# Patient Record
Sex: FEMALE | Race: WHITE | Employment: OTHER | URBAN - METROPOLITAN AREA
[De-identification: names, ages, dates, MRNs, and addresses within clinical notes are randomized per-mention and may not be internally consistent; named-entity substitution may affect disease eponyms.]

---

## 2020-05-30 ENCOUNTER — HOSPITAL ENCOUNTER (EMERGENCY)
Age: 80
Discharge: HOME | End: 2020-05-30
Attending: EMERGENCY MEDICINE
Payer: MEDICARE

## 2020-05-30 VITALS
TEMPERATURE: 98.3 F | RESPIRATION RATE: 18 BRPM | HEART RATE: 68 BPM | DIASTOLIC BLOOD PRESSURE: 82 MMHG | WEIGHT: 170 LBS | HEIGHT: 60 IN | BODY MASS INDEX: 33.38 KG/M2 | OXYGEN SATURATION: 96 % | SYSTOLIC BLOOD PRESSURE: 202 MMHG
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
## 2020-05-30 PROCEDURE — 99282 EMERGENCY DEPT VISIT SF MDM: CPT

## 2020-05-30 RX ORDER — OLMESARTAN MEDOXOMIL 40 MG/1
TABLET ORAL
COMMUNITY
Start: 2020-05-16

## 2020-05-30 NOTE — DISCHARGE INSTRUCTIONS
Benadryl cream to the area. Continue with ice to the area and oral antihistamine. Follow up with your primary care provider for a recheck if symptoms fail to improve or worsen. Return to the emergency department as needed.

## 2020-05-30 NOTE — ED PROVIDER NOTES
Patient states last night she was stung by something on her right forearm. She denies seeing insect but states she did \"feel it\". She states she has been using cool compresses to the area, antihistamine, and anti itch spray to the area but area continues to be swollen and painful. She denies problems breathing. She is speaking in complete sentences. She is in on acute distress at this time. The history is provided by the patient. Skin Problem           Past Medical History:   Diagnosis Date    Hypertension     Sinus problem        Past Surgical History:   Procedure Laterality Date    HX BREAST BIOPSY           History reviewed. No pertinent family history. Social History     Socioeconomic History    Marital status:      Spouse name: Not on file    Number of children: Not on file    Years of education: Not on file    Highest education level: Not on file   Occupational History    Not on file   Social Needs    Financial resource strain: Not on file    Food insecurity     Worry: Not on file     Inability: Not on file    Transportation needs     Medical: Not on file     Non-medical: Not on file   Tobacco Use    Smoking status: Never Smoker    Smokeless tobacco: Never Used   Substance and Sexual Activity    Alcohol use:  Yes    Drug use: Not on file    Sexual activity: Not on file   Lifestyle    Physical activity     Days per week: Not on file     Minutes per session: Not on file    Stress: Not on file   Relationships    Social connections     Talks on phone: Not on file     Gets together: Not on file     Attends Yazidism service: Not on file     Active member of club or organization: Not on file     Attends meetings of clubs or organizations: Not on file     Relationship status: Not on file    Intimate partner violence     Fear of current or ex partner: Not on file     Emotionally abused: Not on file     Physically abused: Not on file     Forced sexual activity: Not on file   Other Topics Concern    Not on file   Social History Narrative    Not on file         ALLERGIES: Latex, natural rubber; Celecoxib; Metoprolol succinate; Ezetimibe-simvastatin; and Sulfasalazine    Review of Systems   Constitutional: Negative for fever. Respiratory: Negative for cough and shortness of breath. Musculoskeletal: Positive for myalgias. Skin: Positive for color change. Vitals:    05/30/20 1143 05/30/20 1203   BP: (!) 202/82    Pulse: 68    Resp: 18    Temp: 98.3 °F (36.8 °C)    SpO2: 96% 96%   Weight: 77.1 kg (170 lb)    Height: 5' (1.524 m)             Physical Exam  Vitals signs and nursing note reviewed. Constitutional:       Appearance: Normal appearance. HENT:      Head: Normocephalic and atraumatic. Nose: Nose normal.      Mouth/Throat:      Mouth: Mucous membranes are moist.   Eyes:      Pupils: Pupils are equal, round, and reactive to light. Neck:      Musculoskeletal: Normal range of motion and neck supple. Cardiovascular:      Rate and Rhythm: Normal rate. Pulses: Normal pulses. Heart sounds: Normal heart sounds. Pulmonary:      Effort: Pulmonary effort is normal.      Breath sounds: Normal breath sounds. Abdominal:      General: Abdomen is flat. There is no distension. Skin:     General: Skin is warm and dry. Findings: Erythema present. Comments: Swelling, erythema, and warmth noted to right forearm. Neurological:      General: No focal deficit present. Mental Status: She is alert and oriented to person, place, and time. Psychiatric:         Mood and Affect: Mood normal.         Behavior: Behavior normal.          MDM  Number of Diagnoses or Management Options  Bug bite, initial encounter:   Diagnosis management comments: Continue current management.      Patient Progress  Patient progress: stable         Procedures

## 2020-05-30 NOTE — ED NOTES
I have reviewed discharge instructions with the patient. The patient verbalized understanding. Patient left ED via Discharge Method: ambulatory to Home with self. Opportunity for questions and clarification provided. Patient given 0 scripts. To continue your aftercare when you leave the hospital, you may receive an automated call from our care team to check in on how you are doing. This is a free service and part of our promise to provide the best care and service to meet your aftercare needs.  If you have questions, or wish to unsubscribe from this service please call [phone]. Thank you for Choosing our [facility].

## 2024-12-21 ENCOUNTER — HOSPITAL ENCOUNTER (EMERGENCY)
Age: 84
Discharge: HOME | End: 2024-12-21
Payer: MEDICARE

## 2024-12-21 VITALS
SYSTOLIC BLOOD PRESSURE: 185 MMHG | HEART RATE: 75 BPM | HEIGHT: 62 IN | TEMPERATURE: 98 F | WEIGHT: 150 LBS | RESPIRATION RATE: 20 BRPM | DIASTOLIC BLOOD PRESSURE: 85 MMHG | BODY MASS INDEX: 27.6 KG/M2 | OXYGEN SATURATION: 99 %

## 2024-12-21 DIAGNOSIS — S01.512A LACERATION OF ORAL CAVITY, INITIAL ENCOUNTER: ICD-10-CM
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
## 2024-12-21 PROCEDURE — 99283 EMERGENCY DEPT VISIT LOW MDM: CPT

## 2024-12-21 RX ORDER — CHLORHEXIDINE GLUCONATE ORAL RINSE 1.2 MG/ML
15 SOLUTION DENTAL 2 TIMES DAILY
Qty: 210 ML | Refills: 0 | Status: SHIPPED | OUTPATIENT
Start: 2024-12-21 | End: 2024-12-28

## 2024-12-21 ASSESSMENT — LIFESTYLE VARIABLES
HOW OFTEN DO YOU HAVE A DRINK CONTAINING ALCOHOL: NEVER
HOW MANY STANDARD DRINKS CONTAINING ALCOHOL DO YOU HAVE ON A TYPICAL DAY: PATIENT DOES NOT DRINK

## 2024-12-21 ASSESSMENT — PAIN - FUNCTIONAL ASSESSMENT: PAIN_FUNCTIONAL_ASSESSMENT: NONE - DENIES PAIN

## 2024-12-21 NOTE — DISCHARGE INSTRUCTIONS
As we discussed, oral lacerations typically heal very quickly and I do not feel that this wound needs any repair at this time.  Use mouthwash as prescribed.  Make sure you rinse your mouth out thoroughly after eating.  Take Tylenol or Motrin if needed for pain.  Follow-up with your dentist for recheck.  Return to the emergency department if you develop any new, worsening, or concerning symptoms.

## 2024-12-21 NOTE — ED TRIAGE NOTES
Pt c/o falling forward earlier today and bit her lip and now has inner bottom lip lac. Denies hitting back of head or pain any where else.

## 2024-12-21 NOTE — ED PROVIDER NOTES
Emergency Department Provider Note       PCP: Lexis Argueta MD   Age: 84 y.o.   Sex: female     DISPOSITION Decision To Discharge 12/21/2024 02:37:39 PM    ICD-10-CM    1. Fall, initial encounter  W19.XXXA       2. Laceration of oral cavity, initial encounter  S01.512A           Medical Decision Making     Well-appearing 84-year-old female presents to the emergency department today after mechanical fall for a laceration to her inner lower lip.  Wound inspected under bright light with good visualization.  No traumatic injury to the teeth noted on exam.  Wound appears to have been caused by her dental prosthesis.  Area is hemostatic.  Approximately 0.5 cm.  Wound does not gape open.  With this being inside of her mouth, I do not feel this needs repair given its relatively small size.  We discussed wound care.  We discussed risk of infection.  She denies any loss of consciousness.  She does have abrasions to both of her palms noted where she tried to catch herself.  She does have full range of motion of both wrist and upper extremities.  No tenderness with palpation of the spine.  She is ambulatory with normal, steady gait.  I have offered CT imaging of the head and neck as well as x-ray imaging of the wrist and right elbow.  Patient declines any imaging today.  She does not feel that she needs any imaging.  Will provide prescription for Peridex mouthwash.  I have advised her to rinse her mouth out thoroughly after eating.  Encouraged follow-up with her dentist for recheck.  ER return precautions discussed but she does appear stable and appropriate for discharge.     1 acute, uncomplicated illness or injury.  Prescription drug management performed.  Shared medical decision making was utilized in creating the patients health plan today.  I independently ordered and reviewed each unique test.                         History     84-year-old female presents to the emergency department today with complaint  DAYHistorical Med      ondansetron (ZOFRAN) 4 MG tablet TAKE 1 TABLET BY MOUTH THREE TIMES A DAY AS NEEDED FOR NAUSEAHistorical Med              Results from this emergency department visit:      No results found for any visits on 12/21/24.      No orders to display                No results for input(s): \"COVID19\" in the last 72 hours.     Voice dictation software was used during the making of this note.  This software is not perfect and grammatical and other typographical errors may be present.  This note has not been completely proofread for errors.        Ami Logan, APRN - CNP  12/21/24 [id number]